# Patient Record
Sex: FEMALE | Race: WHITE | NOT HISPANIC OR LATINO | Employment: UNEMPLOYED | URBAN - METROPOLITAN AREA
[De-identification: names, ages, dates, MRNs, and addresses within clinical notes are randomized per-mention and may not be internally consistent; named-entity substitution may affect disease eponyms.]

---
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
## 2025-01-16 ENCOUNTER — OFFICE VISIT (OUTPATIENT)
Dept: OBSTETRICS AND GYNECOLOGY | Facility: CLINIC | Age: 20
End: 2025-01-16
Payer: COMMERCIAL

## 2025-01-16 VITALS — DIASTOLIC BLOOD PRESSURE: 78 MMHG | SYSTOLIC BLOOD PRESSURE: 116 MMHG | WEIGHT: 184 LBS

## 2025-01-16 DIAGNOSIS — Z00.00 ANNUAL PHYSICAL EXAM: ICD-10-CM

## 2025-01-16 DIAGNOSIS — N92.6 IRREGULAR MENSTRUAL CYCLE: Primary | ICD-10-CM

## 2025-01-16 NOTE — PROGRESS NOTES
HPI   Tracey Sanders  is a 19 y.o. female who presents for 2 reasons.  First, she would like to establish care and have a routine gynecologic exam.  Overall, she is feeling well.  Bowels and bladder are functioning normally.  The patient is sexually active.  She is not currently using contraception and does not wish to conceive.  She would like to discuss contraceptive options.  Next, the patient has cycle irregularity.  Prior to starting oral contraceptive pills, her cycles were regular and predictable.  She has been on Junel recently.  With this, her cycles remain regular.  After stopping Dorothea, she was amenorrheic for 7 months and had her first cycle 2 weeks ago.  She reports intermittent headaches, but does have a diagnosis of migraine.  Denies cold intolerance or fatigue.    Chief Complaint   Patient presents with    Annual Exam     Went 7 months no cycle pt has had cycle since   Pt would like b/c        History reviewed. No pertinent past medical history.    Past Surgical History:   Procedure Laterality Date    ADENOIDECTOMY      FRACTURE SURGERY         Social History     Socioeconomic History    Marital status: Single   Tobacco Use    Smoking status: Never   Substance and Sexual Activity    Alcohol use: No    Drug use: Never    Sexual activity: Yes     Partners: Male     Birth control/protection: None       The following portions of the patient's history were reviewed and updated as appropriate: allergies, current medications, past family history, past medical history, past social history, past surgical history and problem list.    Review of Systems     This is positive for irregular cycles.  Negative for unexplained weight change.  Negative for cold intolerance or fatigue.  All other systems are reviewed and are negative.    Physical Exam  Vitals and nursing note reviewed.   Constitutional:       Appearance: She is well-developed.   HENT:      Head: Normocephalic and atraumatic.   Cardiovascular:       Rate and Rhythm: Normal rate and regular rhythm.   Pulmonary:      Effort: Pulmonary effort is normal.      Breath sounds: Normal breath sounds. No wheezing or rales.   Chest:      Comments: The breasts are homogeneous.  There are no palpable lumps.  Nipple discharge and axillary adenopathy are absent.  Abdominal:      General: There is no distension.      Palpations: Abdomen is soft.      Tenderness: There is no abdominal tenderness.   Genitourinary:     Labia:         Right: No lesion.         Left: No lesion.       Vagina: Normal. No vaginal discharge.      Cervix: No cervical motion tenderness.      Uterus: Normal. Not enlarged and not tender.       Adnexa:         Right: No mass or tenderness.          Left: No mass or tenderness.     Skin:     General: Skin is warm and dry.   Neurological:      Mental Status: She is alert and oriented to person, place, and time.         Assessment    Diagnoses and all orders for this visit:    1. Irregular menstrual cycle (Primary)  -     TSH  -     Prolactin  -     US Non-ob Transvaginal; Future  -     HCG, B-subunit, Quantitative  -     Chlamydia trachomatis, Neisseria gonorrhoeae, Trichomonas vaginalis, PCR - Urine, Urine, Clean Catch    2. Annual physical exam        Plan  Normal gynecologic exam  Contraceptive counseling.  We discussed contraceptive options.  The patient would like to use Nexplanon.  We discussed the procedure itself as well as its benefits, risks and alternatives.  I answered the patient's questions.  She would like to proceed.  This can be performed after completion of the patient's workup for irregular cycles.  Safe sex counseling was undertaken and questions answered.  Irregular cycles.  It is uncertain if this is a residual effect from the patient's oral contraceptive pill or if this could represent polycystic ovarian syndrome or other forms of anovulation.  I counseled the patient and answered her questions.  I recommend an ultrasound as well as  thyroid-stimulating hormone and prolactin.  Also, we will check a quantitative hCG due to cycle irregularity and unprotected sex.  The patient does not have any pregnancy symptoms.  I counseled her and answered her questions.  She agrees with this recommendation for workup.  Further recommendations pending the results of these tests.    No follow-ups on file.    Social History     Tobacco Use   Smoking Status Never   Smokeless Tobacco Not on file

## 2025-01-17 LAB
HCG INTACT+B SERPL-ACNC: <1 MIU/ML
PROLACTIN SERPL-MCNC: 14.2 NG/ML (ref 4.8–33.4)
TSH SERPL DL<=0.005 MIU/L-ACNC: 0.55 UIU/ML (ref 0.27–4.2)

## 2025-01-18 LAB
C TRACH RRNA SPEC QL NAA+PROBE: NEGATIVE
N GONORRHOEA RRNA SPEC QL NAA+PROBE: NEGATIVE
T VAGINALIS RRNA SPEC QL NAA+PROBE: NEGATIVE